# Patient Record
Sex: MALE | Race: WHITE
[De-identification: names, ages, dates, MRNs, and addresses within clinical notes are randomized per-mention and may not be internally consistent; named-entity substitution may affect disease eponyms.]

---

## 2019-08-01 ENCOUNTER — HOSPITAL ENCOUNTER (EMERGENCY)
Dept: HOSPITAL 74 - JERFT | Age: 24
Discharge: HOME | End: 2019-08-01
Payer: COMMERCIAL

## 2019-08-01 VITALS — TEMPERATURE: 98.1 F | DIASTOLIC BLOOD PRESSURE: 81 MMHG | HEART RATE: 73 BPM | SYSTOLIC BLOOD PRESSURE: 143 MMHG

## 2019-08-01 VITALS — BODY MASS INDEX: 28.3 KG/M2

## 2019-08-01 DIAGNOSIS — S01.512A: ICD-10-CM
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
## 2019-08-01 DIAGNOSIS — S01.511A: Primary | ICD-10-CM
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
## 2019-08-01 PROCEDURE — 3E0234Z INTRODUCTION OF SERUM, TOXOID AND VACCINE INTO MUSCLE, PERCUTANEOUS APPROACH: ICD-10-PCS | Performed by: STUDENT IN AN ORGANIZED HEALTH CARE EDUCATION/TRAINING PROGRAM

## 2019-08-01 NOTE — PDOC
History of Present Illness





- General


Chief Complaint: Laceration


Stated Complaint: LIP INJURY


Time Seen by Provider: 08/01/19 16:46


History Source: Patient


Exam Limitations: Clinical Condition





- History of Present Illness


Initial Comments: 





08/01/19 17:20


Patient with no significant past medical history present with complaint of 

laceration to right in the lip status post accidentally hitting his mouth with 

a wrench while at work today causing laceration to inner upper lip and bleeding 

to gums of front upper teeth.  Patient report he had told the job that the 

wrench has a rebound and not safe but job did not fix.Patient does not recall 

last tetanus vaccine. 


Timing/Duration: reports: just prior to arrival





Past History





- Past Medical History


Allergies/Adverse Reactions: 


 Allergies











Allergy/AdvReac Type Severity Reaction Status Date / Time


 


No Known Allergies Allergy   Verified 08/01/19 16:38











COPD: No





- Immunization History


Immunization Up to Date: No





- Suicide/Smoking/Psychosocial Hx


Smoking Status: No


Smoking History: Never smoked


Have you smoked in the past 12 months: No


Number of Cigarettes Smoked Daily: 0


Hx Alcohol Use: No


Drug/Substance Use Hx: No


Substance Use Type: None





**Review of Systems





- Review of Systems


Able to Perform ROS?: Yes


Is the patient limited English proficient: No


Constitutional: No: Malaise, Weakness


HEENTM: Yes: Dental Problems (upper gum bleeding), Other (upper lip laceration)

.  No: Symptoms Reported, Mouth Swelling


Respiratory: No: Symptoms reported


Cardiac (ROS): No: Symptoms Reported


ABD/GI: No: Symptoms Reported


Musculoskeletal: Yes: Symptoms Reported, Muscle Pain (upper lip pain)


Integumentary: Yes: Symptoms Reported, Other (laceration to upper lip)


Neurological: No: Symptoms reported


All Other Systems: Reviewed and Negative





*Physical Exam





- Vital Signs


 Last Vital Signs











Temp Pulse Resp BP Pulse Ox


 


 98.1 F   73   18   143/81   100 


 


 08/01/19 16:38  08/01/19 16:38  08/01/19 16:38  08/01/19 16:38  08/01/19 16:38














- Physical Exam


General Appearance: Yes: Nourished, Appropriately Dressed.  No: Apparent 

Distress


HEENT: positive: Normal ENT Inspection, Other (1cm laceration to inner upper 

lip. small amount of blood around gumline of right upper incisors)


Neck: positive: Supple


Respiratory/Chest: positive: Normal Breath Sounds.  negative: Respiratory 

Distress, Accessory Muscle Use


Musculoskeletal: positive: Normal Inspection


Integumentary: positive: Normal Color, Other (1cm laceration to upper inner lip 

with minimal bleeding).  negative: Swelling (no lip swelling)


Neurologic: positive: Fully Oriented, Alert, Normal Mood/Affect, Normal Response





Medical Decision Making





- Medical Decision Making





08/01/19 17:27


Patient with no significant past medical history present with complaint of 

laceration to right in the lip status post accidentally hitting his mouth with 

a wrench while at work today causing laceration to inner upper lip and bleeding 

to gums of front upper teeth.  Patient report he had told the job that the 

wrench has a rebound and not safe but job did not fix.Patient does not recall 

last tetanus vaccine. 


Exam significant for 2cm laceration to upper inner lip with minimal bleeding. 

mild blood around upper 2 incisors. no loose teeth. 


No need for suture placement as laceration can heal without intervention. 

Tetanus vaccine given. Patient advised to do warm salt water rinse. Stable for 

discharge with dental f/u as needed





*DC/Admit/Observation/Transfer


Diagnosis at time of Disposition: 


Laceration of lip


Qualifiers:


 Encounter type: initial encounter Qualified Code(s): S01.511A - Laceration 

without foreign body of lip, initial encounter








- Discharge Dispostion


Disposition: HOME


Condition at time of disposition: Stable


Decision to Admit order: No





- Referrals





- Patient Instructions


Printed Discharge Instructions:  DI for Frenulum Laceration in the Mouth, DI 

for Minor Laceration


Additional Instructions: 


The lip laceration should heal without sutures. rinse mouth with salt water 2-

3times to help cleanse the wound. Apply cold compress to lip today to help 

prevent lip swelling and switch to warm compress tomorrow if lip swelling. 

Follow-up with PCP as needed





- Post Discharge Activity

## 2019-12-08 ENCOUNTER — HOSPITAL ENCOUNTER (EMERGENCY)
Dept: HOSPITAL 74 - JER | Age: 24
LOS: 1 days | Discharge: HOME | End: 2019-12-09
Payer: COMMERCIAL

## 2019-12-08 VITALS — TEMPERATURE: 98.1 F

## 2019-12-08 VITALS — BODY MASS INDEX: 29.2 KG/M2

## 2019-12-08 DIAGNOSIS — E78.00: ICD-10-CM

## 2019-12-08 DIAGNOSIS — K29.00: Primary | ICD-10-CM

## 2019-12-08 DIAGNOSIS — F17.210: ICD-10-CM

## 2019-12-08 PROCEDURE — 3E033GC INTRODUCTION OF OTHER THERAPEUTIC SUBSTANCE INTO PERIPHERAL VEIN, PERCUTANEOUS APPROACH: ICD-10-PCS

## 2019-12-08 NOTE — PDOC
History of Present Illness





- General


Chief Complaint: Lightheaded


Stated Complaint: VOMITTING/LIGHT HEADED/HEADACHE


Time Seen by Provider: 12/08/19 23:25


History Source: Patient


Exam Limitations: No Limitations





- History of Present Illness


Initial Comments: 





12/08/19 23:47


24y previously healthy M presenting w lightheadedness, vomiting, cough. First 

started feeling lightheaded and dizzy exacerbated w sitting/standing up 6d ago 

after trip to gym, with coexistant poor PO appetite, more frequent vomiting (3x 

today), and epigastric discomfort after vomiting. Went to urgent clinic 5d ago, 

diagnosed w sore throat, sent home w amoxicillin prescription without relief. 

This morning woke up w non productive cough, subjective fevers/chills, nasal 

congestion, headache which self resolved. Denies change in vision, chest pain, 

urinary changes, diarrhea/constipation. 





Past History





- Past Medical History


Allergies/Adverse Reactions: 


 Allergies











Allergy/AdvReac Type Severity Reaction Status Date / Time


 


No Known Allergies Allergy   Verified 08/01/19 16:38











COPD: No


Hypercholesterolemia: Yes





- Immunization History


Immunization Up to Date: No





- Psycho Social/Smoking Cessation Hx


Smoking Status: No


Smoking History: Current every day smoker


Have you smoked in the past 12 months: Yes


Number of Cigarettes Smoked Daily: 1


Information on smoking cessation initiated: Yes


Hx Alcohol Use: No (socially)


Drug/Substance Use Hx: No


Substance Use Type: None





**Review of Systems





- Review of Systems


Constitutional: Yes: Chills, Fever, Malaise, Weakness


HEENTM: Yes: Nose Congestion.  No: Eye Pain, Throat Pain, Mouth Pain


Respiratory: Yes: Cough.  No: Shortness of Breath


Cardiac (ROS): No: Chest Pain, Palpitations, Syncope


ABD/GI: Yes: Nausea, Vomiting.  No: Abdominal Distended, Constipated, Diarrhea


: No: Burning, Dysuria, Hematuria


Musculoskeletal: No: Back Pain, Joint Pain, Muscle Weakness


Integumentary: No: Bruising, Flushing, Lesions


Neurological: Yes: Headache.  No: Seizure, Tingling


Psychiatric: No: Anxiety, Depression, Stressors


Endocrine: No: Excessive Sweating, Flushing, Intolerance to Cold, Intolerance 

to Heat


Hematologic/Lymphatic: No: Anemia, Blood Clots





*Physical Exam





- Vital Signs


 Last Vital Signs











Temp Pulse Resp BP Pulse Ox


 


 98.1 F   123 H  18   163/100   98 


 


 12/08/19 23:05  12/08/19 23:05  12/08/19 23:05  12/08/19 23:05  12/08/19 23:05














- Physical Exam


General Appearance: Yes: Nourished, Appropriately Dressed, Mild Distress


HEENT: positive: EOMI, KULWINDER, Normal Voice, Nasal Congestion, Rhinorrhea, 

Hearing Grossly Normal.  negative: Scleral Icterus (R), Scleral Icterus (L)


Respiratory/Chest: positive: Lungs Clear, Normal Breath Sounds.  negative: 

Chest Tender, Respiratory Distress, Crackles, Rales, Rhonchi, Stridor, Wheezing


Cardiovascular: positive: Regular Rhythm, Regular Rate, S1, S2.  negative: Edema

, Murmur


Gastrointestinal/Abdominal: positive: Normal Bowel Sounds, Flat, Soft.  negative

: Tender, Organomegaly, Distended, Guarding, Rebound, Tenderness, Hernia, Mass


Extremity: positive: Delayed Capillary Refill


Integumentary: positive: Normal Color, Dry


Neurologic: positive: CNs II-XII NML intact, Fully Oriented, Alert, Normal Mood/

Affect, Normal Response, Responsive.  negative: Sensory Deficit, Confused, 

Disoriented





ED Treatment Course





- LABORATORY


CBC & Chemistry Diagram: 


 12/09/19 00:35





 12/09/19 00:35





Medical Decision Making





- Medical Decision Making





12/08/19 23:51


CBC CMP flu strep swabs


EKG shows NSR, HR 81, QTc 390, no ST changes


1L NS, zofran





---





24y previously healthy M presenting w 6d lightheadedness, vomiting, and 1d 

subjective fevers and cough likely d/t gastritis (vomiting) and/or URI (nasal 

congestion). Tested strep and flu negative. No evidence of ACS (NSR EKG, neg 

trop). Given 1L NS and zofran. DC home w PCP f/u





Discharge





- Discharge Information


Problems reviewed: Yes


Clinical Impression/Diagnosis: 


Gastritis


Qualifiers:


 Gastritis type: unspecified gastritis Chronicity: acute Gastritis bleeding: 

without bleeding Qualified Code(s): K29.00 - Acute gastritis without bleeding





Condition: Improved


Disposition: HOME





- Admission


No





- Follow up/Referral





- Patient Discharge Instructions


Patient Printed Discharge Instructions:  DI for Vomiting -- Adult


Additional Instructions: 


You were seen for vomiting. Your labs did not show anything concerning. You 

were given fluids and medication





Please follow up with your primary care doctor regarding your symptoms. Drink 

lots of water





Come back to the ED if you continue vomiting, vomit blood, or chest pain.





- Post Discharge Activity

## 2019-12-09 VITALS — DIASTOLIC BLOOD PRESSURE: 77 MMHG | HEART RATE: 83 BPM | SYSTOLIC BLOOD PRESSURE: 130 MMHG

## 2019-12-09 LAB
ALBUMIN SERPL-MCNC: 4.2 G/DL (ref 3.4–5)
ALP SERPL-CCNC: 109 U/L (ref 45–117)
ALT SERPL-CCNC: 54 U/L (ref 13–61)
ANION GAP SERPL CALC-SCNC: 7 MMOL/L (ref 8–16)
AST SERPL-CCNC: 24 U/L (ref 15–37)
BASOPHILS # BLD: 0.4 % (ref 0–2)
BILIRUB SERPL-MCNC: 0.4 MG/DL (ref 0.2–1)
BUN SERPL-MCNC: 14.3 MG/DL (ref 7–18)
CALCIUM SERPL-MCNC: 9.3 MG/DL (ref 8.5–10.1)
CHLORIDE SERPL-SCNC: 105 MMOL/L (ref 98–107)
CO2 SERPL-SCNC: 28 MMOL/L (ref 21–32)
CREAT SERPL-MCNC: 0.8 MG/DL (ref 0.55–1.3)
DEPRECATED RDW RBC AUTO: 13.3 % (ref 11.9–15.9)
EOSINOPHIL # BLD: 1.1 % (ref 0–4.5)
GLUCOSE SERPL-MCNC: 118 MG/DL (ref 74–106)
HCT VFR BLD CALC: 48.1 % (ref 35.4–49)
HGB BLD-MCNC: 16.4 GM/DL (ref 11.7–16.9)
LYMPHOCYTES # BLD: 23.7 % (ref 8–40)
MCH RBC QN AUTO: 29.9 PG (ref 25.7–33.7)
MCHC RBC AUTO-ENTMCNC: 34.1 G/DL (ref 32–35.9)
MCV RBC: 87.8 FL (ref 80–96)
MONOCYTES # BLD AUTO: 4.8 % (ref 3.8–10.2)
NEUTROPHILS # BLD: 70 % (ref 42.8–82.8)
PLATELET # BLD AUTO: 251 K/MM3 (ref 134–434)
PMV BLD: 8.9 FL (ref 7.5–11.1)
POTASSIUM SERPLBLD-SCNC: 4.1 MMOL/L (ref 3.5–5.1)
PROT SERPL-MCNC: 8.1 G/DL (ref 6.4–8.2)
RBC # BLD AUTO: 5.48 M/MM3 (ref 4–5.6)
SODIUM SERPL-SCNC: 141 MMOL/L (ref 136–145)
WBC # BLD AUTO: 8.1 K/MM3 (ref 4–10)

## 2019-12-09 NOTE — PDOC
Documentation entered by Carolyn Roche SCRIBE, acting as scribe for Katerin Valle MD.








Katerin Valle MD:  This documentation has been prepared by the Kaley arreola Nirvannie, SCRIBE, under my direction and personally reviewed by me in 

its entirety.  I confirm that the documentation accurately reflects all work, 

treatment, procedures, and medical decision making performed by me.  





Attending Attestation





- Resident


Resident Name: Constantino Lopez





- ED Attending Attestation


I have performed the following: I have examined & evaluated the patient, The 

case was reviewed & discussed with the resident, I agree w/resident's findings 

& plan





- HPI


HPI: 





12/09/19 00:02


The patient is a 24 year old male, with no significant past medical history, 

who presents to the emergency department with, 6 days of intermittent 

lightheadedness and chest tightness. As per sister at bedside, symptoms often 

times occur while he is driving.








- Physicial Exam


PE: 





12/09/19 00:27


24-year-old male who states he has had intermittent chest pain


Head normocephalic atraumatic


Neck is supple, no JVDs, no bruits


Lungs are clear to auscultation bilaterally


CVS regular rate and rhythm S1-S2


Abdomen is nontender, and nondistended, no rebound or guarding


No flank pain


Skin warm and dry


Neuro alert and oriented x3, ambulatory


12/09/19 00:27








- Medical Decision Making





12/09/19 02:14


EKG is normal sinus rhythm with no signs of acute ischemia


Negative troponin


CBC is unremarkable with no leukocytosis or anemia


Chemistries reviewed


Influenza swabs are negative strep swabs are negative


12/09/19 02:15


imp  atypical chest painUTI symptoms


d/c home

## 2019-12-09 NOTE — EKG
Test Reason : 

Blood Pressure : ***/*** mmHG

Vent. Rate : 081 BPM     Atrial Rate : 081 BPM

   P-R Int : 166 ms          QRS Dur : 082 ms

    QT Int : 336 ms       P-R-T Axes : 042 082 028 degrees

   QTc Int : 390 ms

 

NORMAL SINUS RHYTHM

NORMAL ECG

NO PREVIOUS ECGS AVAILABLE

Confirmed by NICKOLAS EAST MD ([id number]) on 12/9/2019 1:34:57 PM

 

Referred By:             Confirmed By:NICKOLAS EAST MD

## 2025-05-29 ENCOUNTER — OFFICE (OUTPATIENT)
Facility: LOCATION | Age: 30
Setting detail: OPHTHALMOLOGY
End: 2025-05-29
Payer: COMMERCIAL

## 2025-05-29 DIAGNOSIS — H00.12: ICD-10-CM

## 2025-05-29 DIAGNOSIS — H01.001: ICD-10-CM

## 2025-05-29 DIAGNOSIS — H01.005: ICD-10-CM

## 2025-05-29 DIAGNOSIS — H01.004: ICD-10-CM

## 2025-05-29 DIAGNOSIS — H00.15: ICD-10-CM

## 2025-05-29 DIAGNOSIS — H01.002: ICD-10-CM

## 2025-05-29 DIAGNOSIS — H00.11: ICD-10-CM

## 2025-05-29 DIAGNOSIS — H00.14: ICD-10-CM

## 2025-05-29 PROBLEM — H16.223 DRY EYE SYNDROME K SICCA; BOTH EYES: Status: ACTIVE | Noted: 2025-05-29

## 2025-05-29 PROCEDURE — 11900 INJECT SKIN LESIONS </W 7: CPT | Mod: E4,E1 | Performed by: OPHTHALMOLOGY

## 2025-05-29 PROCEDURE — 92002 INTRM OPH EXAM NEW PATIENT: CPT | Performed by: OPHTHALMOLOGY

## 2025-05-29 ASSESSMENT — TEAR BREAK UP TIME (TBUT)
OD_TBUT: 2+
OS_TBUT: 2+

## 2025-05-29 ASSESSMENT — TONOMETRY
OD_IOP_MMHG: 12
OS_IOP_MMHG: 11

## 2025-05-29 ASSESSMENT — REFRACTION_AUTOREFRACTION
OD_SPHERE: -0.25
OD_AXIS: 089
OS_AXIS: 093
OD_CYLINDER: +0.50
OS_SPHERE: -0.25
OS_CYLINDER: +0.75

## 2025-05-29 ASSESSMENT — LID EXAM ASSESSMENTS
OS_BLEPHARITIS: LLL LUL 1+ 2+
OD_BLEPHARITIS: RLL RUL 1+ 2+

## 2025-05-29 ASSESSMENT — VISUAL ACUITY
OD_BCVA: 20/25
OS_BCVA: 20/25

## 2025-05-29 ASSESSMENT — CONFRONTATIONAL VISUAL FIELD TEST (CVF)
OS_FINDINGS: FULL
OD_FINDINGS: FULL

## 2025-06-05 ENCOUNTER — OFFICE (OUTPATIENT)
Facility: LOCATION | Age: 30
Setting detail: OPHTHALMOLOGY
End: 2025-06-05
Payer: COMMERCIAL

## 2025-06-05 ENCOUNTER — RX ONLY (RX ONLY)
Age: 30
End: 2025-06-05

## 2025-06-05 DIAGNOSIS — H01.005: ICD-10-CM

## 2025-06-05 DIAGNOSIS — H00.14: ICD-10-CM

## 2025-06-05 DIAGNOSIS — H00.12: ICD-10-CM

## 2025-06-05 DIAGNOSIS — H01.002: ICD-10-CM

## 2025-06-05 DIAGNOSIS — H16.223: ICD-10-CM

## 2025-06-05 DIAGNOSIS — H01.004: ICD-10-CM

## 2025-06-05 DIAGNOSIS — H01.001: ICD-10-CM

## 2025-06-05 PROCEDURE — 11900 INJECT SKIN LESIONS </W 7: CPT | Mod: E4,E1 | Performed by: OPHTHALMOLOGY

## 2025-06-05 PROCEDURE — 99213 OFFICE O/P EST LOW 20 MIN: CPT | Performed by: OPHTHALMOLOGY

## 2025-06-05 ASSESSMENT — REFRACTION_AUTOREFRACTION
OD_AXIS: 089
OD_CYLINDER: +0.50
OS_AXIS: 093
OS_SPHERE: -0.25
OS_CYLINDER: +0.75
OD_SPHERE: -0.25

## 2025-06-05 ASSESSMENT — TEAR BREAK UP TIME (TBUT)
OS_TBUT: 2+
OD_TBUT: 2+

## 2025-06-05 ASSESSMENT — LID EXAM ASSESSMENTS
OS_BLEPHARITIS: LLL LUL 1+ 2+
OD_BLEPHARITIS: RLL RUL 1+ 2+

## 2025-06-05 ASSESSMENT — VISUAL ACUITY
OD_BCVA: 20/20-1
OS_BCVA: 20/20-2

## 2025-06-12 ENCOUNTER — OFFICE (OUTPATIENT)
Facility: LOCATION | Age: 30
Setting detail: OPHTHALMOLOGY
End: 2025-06-12
Payer: COMMERCIAL

## 2025-06-12 DIAGNOSIS — H02.89: ICD-10-CM

## 2025-06-12 DIAGNOSIS — H00.11: ICD-10-CM

## 2025-06-12 DIAGNOSIS — H00.15: ICD-10-CM

## 2025-06-12 DIAGNOSIS — H16.223: ICD-10-CM

## 2025-06-12 PROCEDURE — 99213 OFFICE O/P EST LOW 20 MIN: CPT | Performed by: OPHTHALMOLOGY

## 2025-06-12 ASSESSMENT — TEAR BREAK UP TIME (TBUT)
OS_TBUT: 2+
OD_TBUT: 2+

## 2025-06-12 ASSESSMENT — TONOMETRY
OD_IOP_MMHG: 18
OS_IOP_MMHG: 18

## 2025-06-12 ASSESSMENT — REFRACTION_AUTOREFRACTION
OD_SPHERE: -0.25
OS_SPHERE: -0.25
OD_CYLINDER: +0.50
OD_AXIS: 089
OS_AXIS: 093
OS_CYLINDER: +0.75

## 2025-06-12 ASSESSMENT — VISUAL ACUITY
OS_BCVA: 20/20-2
OD_BCVA: 20/20-1

## 2025-06-12 ASSESSMENT — LID EXAM ASSESSMENTS
OS_BLEPHARITIS: ABSENT
OD_BLEPHARITIS: ABSENT